# Patient Record
Sex: FEMALE | Race: WHITE | NOT HISPANIC OR LATINO | Employment: FULL TIME | URBAN - METROPOLITAN AREA
[De-identification: names, ages, dates, MRNs, and addresses within clinical notes are randomized per-mention and may not be internally consistent; named-entity substitution may affect disease eponyms.]

---

## 2017-04-19 ENCOUNTER — OFFICE VISIT (OUTPATIENT)
Dept: RETAIL CLINIC | Facility: CLINIC | Age: 42
End: 2017-04-19

## 2017-04-19 DIAGNOSIS — Z02.83 ENCOUNTER FOR DRUG SCREENING: Primary | ICD-10-CM

## 2019-02-13 ENCOUNTER — TRANSCRIBE ORDERS (OUTPATIENT)
Dept: PHYSICAL THERAPY | Facility: CLINIC | Age: 44
End: 2019-02-13

## 2019-02-13 DIAGNOSIS — M79.602 ARM PAIN, LEFT: Primary | ICD-10-CM

## 2019-02-19 ENCOUNTER — TREATMENT (OUTPATIENT)
Dept: PHYSICAL THERAPY | Facility: CLINIC | Age: 44
End: 2019-02-19

## 2019-02-19 DIAGNOSIS — M25.512 ACUTE PAIN OF LEFT SHOULDER: Primary | ICD-10-CM

## 2019-02-19 DIAGNOSIS — M79.602 ARM PAIN, LEFT: ICD-10-CM

## 2019-02-19 DIAGNOSIS — S46.212D STRAIN OF BICEPS MUSCLE, LEFT, SUBSEQUENT ENCOUNTER: ICD-10-CM

## 2019-02-19 PROCEDURE — 97530 THERAPEUTIC ACTIVITIES: CPT | Performed by: PHYSICAL THERAPIST

## 2019-02-19 PROCEDURE — 97161 PT EVAL LOW COMPLEX 20 MIN: CPT | Performed by: PHYSICAL THERAPIST

## 2019-02-19 PROCEDURE — 97110 THERAPEUTIC EXERCISES: CPT | Performed by: PHYSICAL THERAPIST

## 2019-02-19 NOTE — PROGRESS NOTES
"Physical Therapy Initial Evaluation and Plan of Care    Patient: Angie Solis   : 1975  Diagnosis/ICD-10 Code:  Acute pain of left shoulder [M25.512]  Referring practitioner: COLIN Sloan    Subjective Evaluation    History of Present Illness  Date of onset: 2019  Mechanism of injury: Unloading truck at work which was poorly loaded; unloading outdoor metal chairs (stack about 6 ft tall), stack shifted and weight of the stack caused patient to fall with it.  Immediate (L) upper arm pain. By the time she got home she was unable to lift arm without pain.      Went to [facility] ER.  Xray (L) UE - negative, given Tylenol, told to f/u with MD.  F/u with ANGELA [facility].  Placed on work restrictions, recommended PT, given Tylenol, and prescription for Ibuprofen.       Denies prior pain or injury (L) shoulder/arm.      Patient Occupation: Freight Flow Supervisor - [email or web address] Quality of life: good    Pain  Current pain ratin  At best pain ratin  Pain scale at highest: 8-9/10.  Location: (L) upper arm  Quality: dull ache and sharp  Relieving factors: ice, relaxation, rest and heat  Aggravating factors: overhead activity, outstretched reach, repetitive movement, lifting, movement and sleeping (blowdrying or brushing hair, pulling > pushing, using pallet luther, laying on (L) shoulder to sleep)  Progression: improved (\"a bit improved\")    Social Support  Lives with: spouse    Hand dominance: right    Diagnostic Tests  X-ray: normal    Treatments  Current treatment: physical therapy  Patient Goals  Patient goals for therapy: decreased pain and increased motion  Patient goal: get rid of pain, improve range of motion           Objective       Observations     Additional Observation Details  Mild to moderate swelling apparent (L) biceps muscle per visual inspection    Tenderness     Additional Tenderness Details  Mod (+) TTP over (L) LHB tendon/bicipital groove, long head biceps muscle belly, bicipital " "aponeurosis    Active Range of Motion   Left Shoulder   Flexion: 149 (\"pulling\") degrees   Extension: 45 (pulling) degrees   Abduction: 134 (pulling and pain) degrees with pain  External rotation 90°: 71 (pulling and pain) degrees with pain  Internal rotation 90°: 42 (pulling and pain) degrees with pain    Right Shoulder   Flexion: 164 degrees   Extension: 51 degrees   Abduction: 170 degrees   External rotation 90°: 85 degrees  Internal rotation 90°: 56 degrees     Strength/Myotome Testing     Left Shoulder     Planes of Motion   Flexion: 4 (with pain)   Extension: 4+   Abduction: 4- (with pain)   External rotation at 0°: 4+   Internal rotation at 0°: 4 (with pain)     Right Shoulder     Planes of Motion   Flexion: 5   Extension: 5   Abduction: 4+   External rotation at 0°: 4+   Internal rotation at 0°: 5     Left Elbow   Flexion: 4- (with pain)  Extension: 4    Right Elbow   Flexion: 5  Extension strength: 5-/5.    Tests     Left Shoulder   Positive Speed's.          Assessment & Plan     Assessment  Impairments: abnormal coordination, abnormal muscle firing, abnormal or restricted ROM, activity intolerance, impaired physical strength, lacks appropriate home exercise program and pain with function  Other impairment: inability to perform regular work tasks  Assessment details: Patient is a 43 y.o female who injured her (L) upper arm at work on 02/08/19 when a stack of metal outdoor chairs she was unloading from a truck tipped and caused her to fall.  She reports immediate (L) upper arm pain with worsening symptoms and inability to lift arm without pain.  Xrays were negative at ER.  She reports (L) upper arm symptoms 2-9/10 and demonstrates tenderness, decreased and painful (L) UE AROM, decreased (L) UE strength, and exhibits poor tolerance to ADLs and inability to perform regular work tasks.  She will benefit from skilled PT services to address these deficits and assist patient in return to painfree ADL and work " performance.  Prognosis: good  Functional Limitations: carrying objects, lifting, sleeping, pulling, pushing, uncomfortable because of pain, reaching behind back, reaching overhead and unable to perform repetitive tasks  Goals  Plan Goals: STGs: to be met in 2 weeks  1. Patient will be independent with initial HEP  2. Patient will report improved (L) UE symptoms 0-4/10 for improved comfort with ADLs  3. Patient will demonstrate full, painfree (L) shoulder and elbow AROM all planes for improved joint mobility  4. Patient will exhibit min TTP over (L) biceps muscle belly for evidence of decreasing inflammation  5. Patient will have neg Speeds test (L) UE for decreased concern for possible internal derangement    LTGs: to be met in 4 weeks  1. Patient will be independent with progressed strength and stabilization HEP  2. Patient will report full resolution of (L) UE symptoms for improved ADLs  3. Patient will demonstrate improved (L) shoulder MMT 5/5 all planes, painfree for improved function  4. 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 Patient will demonstrate ability to lift, push, pull and carry as necessary to demonstrate readiness to RTW without restrictions      Plan  Therapy options: will be seen for skilled physical therapy services  Planned modality interventions: cryotherapy, electrical stimulation/Russian stimulation and thermotherapy (hydrocollator packs)  Planned therapy interventions: compression, ADL retraining, fine motor coordination training, flexibility, functional ROM exercises, home exercise program, joint mobilization, manual therapy, motor coordination training, neuromuscular re-education, soft tissue mobilization, strengthening, stretching and therapeutic activities  Duration in visits: 10  Treatment plan discussed with: patient        Manual Therapy:         mins   46992;  Therapeutic Exercise:    16     mins  53408;     Neuromuscular Adithya:        mins  53291;    Therapeutic Activity:     12     mins  61241;     Gait Training:           mins  93105;     Ultrasound:          mins  99130;    Electrical Stimulation:         mins  52846 ( );  Dry Needling          mins self-pay    Timed Treatment:   28   mins   Total Treatment:     56   mins    PT SIGNATURE: Kelley Lofton PT, DPT   DATE TREATMENT INITIATED: 2/20/2019    Initial Certification  Certification Period: 5/21/2019  I certify that the therapy services are furnished while this patient is under my care.  The services outlined above are required by this patient, and will be reviewed every 90 days.     PHYSICIAN: Geetha Valencia, APRN      DATE:     Please sign and return via fax to [phone].. Thank you, [facility].

## 2019-02-20 ENCOUNTER — TREATMENT (OUTPATIENT)
Dept: PHYSICAL THERAPY | Facility: CLINIC | Age: 44
End: 2019-02-20

## 2019-02-20 DIAGNOSIS — M25.512 ACUTE PAIN OF LEFT SHOULDER: Primary | ICD-10-CM

## 2019-02-20 DIAGNOSIS — S46.212D STRAIN OF BICEPS MUSCLE, LEFT, SUBSEQUENT ENCOUNTER: ICD-10-CM

## 2019-02-20 DIAGNOSIS — M79.602 ARM PAIN, LEFT: ICD-10-CM

## 2019-02-20 PROCEDURE — 97014 ELECTRIC STIMULATION THERAPY: CPT | Performed by: PHYSICAL THERAPIST

## 2019-02-20 PROCEDURE — 97110 THERAPEUTIC EXERCISES: CPT | Performed by: PHYSICAL THERAPIST

## 2019-02-20 NOTE — PROGRESS NOTES
"Physical Therapy Daily Progress Note    Angie Solis reports: \"I slipped on a fabric softener spill earlier at work today and had to catch myself with a rail using my left arm so I didn't fall and it nuria my arm and elbow.  My elbow and forearm are pretty sore right now.\"    Subjective Evaluation    Pain  Current pain ratin  Location: (L) biceps muscle and medial elbow/proximal forearm           Objective   See Exercise, Manual, and Modality Logs for complete treatment.   *Increased stretch hold times  *Initiated biceps stretch and scap retracts    Assessment & Plan     Assessment  Assessment details: Patient presents with increased (L) UE symptoms, mostly localized around the medial elbow and proximal medial forearm, which she attributes to an episode of having to catch herself at work with (L) UE to prevent a fall.  However she is able to complete all activities this date without symptom exacerbation.  She is cued for painfree performance, most notably with biceps stretching, to avoid increased reactionary muscle tension.  Updated HEP printout is issued this date to promote recall and compliance, and patient responds positively to modality application including IFC electrical stimulation.        Other - reassess for MD.         Manual Therapy:         mins  92168;  Therapeutic Exercise:    27     mins  51203;     Neuromuscular Adithya:        mins  78698;    Therapeutic Activity:          mins  83801;     Gait Training:           mins  44876;     Ultrasound:          mins  11770;    Electrical Stimulation:    15     mins  95980 ( );  Dry Needling          mins self-pay    Timed Treatment:   27   mins   Total Treatment:     45   mins    Kelley Lofton PT, DPT  Physical Therapist  [place] License # [id number]    "

## 2019-02-26 ENCOUNTER — TREATMENT (OUTPATIENT)
Dept: PHYSICAL THERAPY | Facility: CLINIC | Age: 44
End: 2019-02-26

## 2019-02-26 DIAGNOSIS — M79.602 ARM PAIN, LEFT: ICD-10-CM

## 2019-02-26 DIAGNOSIS — M25.512 ACUTE PAIN OF LEFT SHOULDER: Primary | ICD-10-CM

## 2019-02-26 DIAGNOSIS — S46.212D STRAIN OF BICEPS MUSCLE, LEFT, SUBSEQUENT ENCOUNTER: ICD-10-CM

## 2019-02-26 PROCEDURE — 97140 MANUAL THERAPY 1/> REGIONS: CPT | Performed by: PHYSICAL THERAPIST

## 2019-02-26 PROCEDURE — 97014 ELECTRIC STIMULATION THERAPY: CPT | Performed by: PHYSICAL THERAPIST

## 2019-02-26 PROCEDURE — 97110 THERAPEUTIC EXERCISES: CPT | Performed by: PHYSICAL THERAPIST

## 2019-02-26 NOTE — PROGRESS NOTES
"Physical Therapy Daily Progress Note      Visit # 3      Subjective Evaluation    History of Present Illness    Subjective comment: Patient reports that she is feeling close to 80% however still notices pain with certain movements, specifically overhead or out to the side.        Objective       Observations     Additional Observation Details  Mild to moderate swelling apparent (L) biceps muscle per visual inspection    Tenderness     Additional Tenderness Details  Mod (+) TTP over (L) LHB tendon/bicipital groove, long head biceps muscle belly, bicipital aponeurosis    Active Range of Motion   Left Shoulder   Flexion: 155 (\"pulling\") degrees   Extension: 45 (pulling) degrees   Abduction: 142 (pulling and pain) degrees with pain  External rotation 90°: 71 (pulling and pain) degrees with pain  Internal rotation 90°: 42 (pulling and pain) degrees with pain    Right Shoulder   Flexion: 164 degrees   Extension: 51 degrees   Abduction: 170 degrees   External rotation 90°: 85 degrees  Internal rotation 90°: 56 degrees     Strength/Myotome Testing     Left Shoulder     Planes of Motion   Flexion: 4 (with pain)   Extension: 4+   Abduction: 4- (with pain)   External rotation at 0°: 4+   Internal rotation at 0°: 4 (with pain)     Right Shoulder     Planes of Motion   Flexion: 5   Extension: 5   Abduction: 4+   External rotation at 0°: 4+   Internal rotation at 0°: 5     Left Elbow   Flexion: 4- (with pain)  Extension: 4    Right Elbow   Flexion: 5  Extension strength: 5-/5.    Tests     Left Shoulder   Negative Hawkin's and Speed's.      See Exercise, Manual, and Modality Logs for complete treatment.       Assessment & Plan     Assessment  Assessment details: Patient tolerated treatment fairly well.  Had some mild to moderate pain with wall slides and end range ER.      Plan  Plan details: Continue to with ROM and postural strengthening.                      Manual Therapy:    10     mins  56592;  Therapeutic Exercise:    25   "   mins  08775;       Timed Treatment:   35   mins   Total Treatment:     50   mins    Kori Rosas, PT  Physical Therapist

## 2019-02-28 ENCOUNTER — TREATMENT (OUTPATIENT)
Dept: PHYSICAL THERAPY | Facility: CLINIC | Age: 44
End: 2019-02-28

## 2019-02-28 DIAGNOSIS — M79.602 ARM PAIN, LEFT: ICD-10-CM

## 2019-02-28 DIAGNOSIS — M25.512 ACUTE PAIN OF LEFT SHOULDER: Primary | ICD-10-CM

## 2019-02-28 DIAGNOSIS — S46.212D STRAIN OF BICEPS MUSCLE, LEFT, SUBSEQUENT ENCOUNTER: ICD-10-CM

## 2019-02-28 PROCEDURE — 97110 THERAPEUTIC EXERCISES: CPT | Performed by: PHYSICAL THERAPIST

## 2019-02-28 PROCEDURE — 97140 MANUAL THERAPY 1/> REGIONS: CPT | Performed by: PHYSICAL THERAPIST

## 2019-02-28 PROCEDURE — 97014 ELECTRIC STIMULATION THERAPY: CPT | Performed by: PHYSICAL THERAPIST

## 2019-02-28 NOTE — PROGRESS NOTES
"Physical Therapy Daily Progress Note    Angie Solis reports: \"My arm is still a lot better but it is sore today because a box started to fall off a shelf and I instinctively reached out to catch it. Overall my arm is moving better and pain is a little less, but I still have pain and difficulty reaching behind me or anything overhead like brushing or drying my hair with the left arm.\" Patient denies any sharp pain or differing symptoms; just a general increase in soreness of the upper arm region.    Subjective     Objective   See Exercise, Manual, and Modality Logs for complete treatment.       Assessment & Plan     Assessment  Assessment details: Patient experienced an apparent moderate exacerbation of (L) arm symptoms after instinctively reaching to catch a falling box of moderate weight.  She reports increased soreness following this incident as well as some shoulder discomfort.  PROM shoulder flexion is more uncomfortable than remaining planes.  She is encouraged in persistent HEP compliance and ice application.        Progress strengthening /stabilization /functional activity. Reassess for MD?         Manual Therapy:    11     mins  51070;  Therapeutic Exercise:    18     mins  24129;     Neuromuscular Adithya:        mins  62654;    Therapeutic Activity:          mins  63881;     Gait Training:           mins  38574;     Ultrasound:          mins  93107;    Electrical Stimulation:    15     mins  15821 ( );  Dry Needling          mins self-pay    Timed Treatment:   29   mins   Total Treatment:     47   mins    Kelley Lofton PT, DPT  Physical Therapist  [place] License # [id number]    "

## 2019-03-04 ENCOUNTER — TREATMENT (OUTPATIENT)
Dept: PHYSICAL THERAPY | Facility: CLINIC | Age: 44
End: 2019-03-04

## 2019-03-04 DIAGNOSIS — S46.212D STRAIN OF BICEPS MUSCLE, LEFT, SUBSEQUENT ENCOUNTER: ICD-10-CM

## 2019-03-04 DIAGNOSIS — M25.512 ACUTE PAIN OF LEFT SHOULDER: Primary | ICD-10-CM

## 2019-03-04 DIAGNOSIS — M79.602 ARM PAIN, LEFT: ICD-10-CM

## 2019-03-04 PROCEDURE — 97110 THERAPEUTIC EXERCISES: CPT | Performed by: PHYSICAL THERAPIST

## 2019-03-04 PROCEDURE — 97140 MANUAL THERAPY 1/> REGIONS: CPT | Performed by: PHYSICAL THERAPIST

## 2019-03-04 PROCEDURE — 97014 ELECTRIC STIMULATION THERAPY: CPT | Performed by: PHYSICAL THERAPIST

## 2019-03-04 NOTE — PROGRESS NOTES
"Physical Therapy Daily Progress Note    Angie Solis reports: \"My arm has calmed down after the incident the other day.  It's back now to where overhead, reaching across my body, and reaching behind my back are still what cause pain and give me trouble.\"    Subjective     Objective   See Exercise, Manual, and Modality Logs for complete treatment.   *Initiated (L) biceps soft tissue with active release    Assessment & Plan     Assessment  Assessment details: Patient exhibits moderate tenderness of the (L) proximal > distal biceps muscle belly.  She also consistently reports pain with combined horizontal adduction and elevation of (L) UE. Symptoms have somewhat subsided since exacerbation following patient reaching to catch a falling box last week at work. Passively this date she demonstrates full, painfree (L) shoulder flexion, abduction, and external rotation, though overhead flexion is painful when performed actively or active assisted.        Progress strengthening /stabilization /functional activity         Manual Therapy:    13     mins  01489;  Therapeutic Exercise:    21     mins  39481;     Neuromuscular Adithya:        mins  01519;    Therapeutic Activity:          mins  53666;     Gait Training:           mins  78084;     Ultrasound:          mins  67324;    Electrical Stimulation:  15       mins  70239 ( );  Dry Needling          mins self-pay    Timed Treatment:   34   mins   Total Treatment:     49   mins    Kelley Lofton PT, DPT  Physical Therapist  [place] License # [id number]    "

## 2019-03-06 ENCOUNTER — TREATMENT (OUTPATIENT)
Dept: PHYSICAL THERAPY | Facility: CLINIC | Age: 44
End: 2019-03-06

## 2019-03-06 DIAGNOSIS — M25.512 ACUTE PAIN OF LEFT SHOULDER: Primary | ICD-10-CM

## 2019-03-06 DIAGNOSIS — S46.212D STRAIN OF BICEPS MUSCLE, LEFT, SUBSEQUENT ENCOUNTER: ICD-10-CM

## 2019-03-06 DIAGNOSIS — M79.602 ARM PAIN, LEFT: ICD-10-CM

## 2019-03-06 PROCEDURE — 97110 THERAPEUTIC EXERCISES: CPT | Performed by: PHYSICAL THERAPIST

## 2019-03-06 PROCEDURE — 97014 ELECTRIC STIMULATION THERAPY: CPT | Performed by: PHYSICAL THERAPIST

## 2019-03-06 PROCEDURE — 97140 MANUAL THERAPY 1/> REGIONS: CPT | Performed by: PHYSICAL THERAPIST

## 2019-03-11 ENCOUNTER — TREATMENT (OUTPATIENT)
Dept: PHYSICAL THERAPY | Facility: CLINIC | Age: 44
End: 2019-03-11

## 2019-03-11 DIAGNOSIS — S46.212D STRAIN OF BICEPS MUSCLE, LEFT, SUBSEQUENT ENCOUNTER: ICD-10-CM

## 2019-03-11 DIAGNOSIS — M25.512 ACUTE PAIN OF LEFT SHOULDER: Primary | ICD-10-CM

## 2019-03-11 DIAGNOSIS — M79.602 ARM PAIN, LEFT: ICD-10-CM

## 2019-03-11 PROCEDURE — 97014 ELECTRIC STIMULATION THERAPY: CPT | Performed by: PHYSICAL THERAPIST

## 2019-03-11 PROCEDURE — 97530 THERAPEUTIC ACTIVITIES: CPT | Performed by: PHYSICAL THERAPIST

## 2019-03-11 PROCEDURE — 97110 THERAPEUTIC EXERCISES: CPT | Performed by: PHYSICAL THERAPIST

## 2019-03-11 NOTE — PROGRESS NOTES
"Physical Therapy Progress Note    3/11/2019  Geetha Valencia, APRN    Re: Angie Solis  ________________________________________________________________    Mrs. Angie Solis, has attended 7/7 PT sessions.  Treatment has consisted of: patient education, therapeutic activity, therapeutic exercise, manual therapy, and modalities.     S: Mrs. Angie Solsi states: \"My arm is feeling better than it was last week.  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  I still have pain when I reach across with that arm and raise it up at the same time.\" Patient denies noticeable popping or catching (L) shoulder.    Subjective Evaluation    Pain  Pain scale: \"discomfort\"  At worst pain ratin (sharp but brief)  Location: (L) anterior shoulder           Objective       Tenderness     Left Shoulder   Tenderness in the biceps tendon (proximal).     Additional Tenderness Details  Min/mod (+) TTP (L) anterior GH joint line, proximal LHB tendon, proximal biceps muscle belly    Active Range of Motion   Left Shoulder   Flexion: 164 degrees   Extension: 52 degrees   Abduction: 131 (eccentric pain) degrees with pain  External rotation 90°: 79 degrees with pain  Internal rotation 90°: 61 degrees     Strength/Myotome Testing     Left Shoulder     Planes of Motion   Left shoulder forward flexion strength: 5-/5.   Extension: 5   Abduction: 4+ (with pain)   Left shoulder external rotation strength at 0 degrees: 5-/5.   Internal rotation at 0°: 4+ (with pain)     Left Elbow   Flexion: 5  Extension strength: 5-/5, with discomfort     Tests     Left Shoulder   Positive active compression (Eitzen) and passive horizontal adduction.      See Exercise, Manual, and Modality Logs for complete treatment.       Assessment & Plan     Assessment  Assessment details: Patient has been compliant and cooperative with PT efforts.  Overall bicipital " symptoms have demonstrated good improvement since initial evaluation but of concern now is localization of pain to (L) anterior shoulder and proximal LHB tendon. She reports pain with combined horizontal adduction and elevation, horizontal abduction, eccentric pain with abduction, and pain at end range combined abduction and external rotation.  She is tender to palpation at (L) anterior joint line, LHB tendon, and proximal biceps muscle belly.  Zepeda's test and passive horizontal adduction test are positive.  It may be beneficial to proceed with soft tissue imaging for this patient if you are in agreement.        Other - patient to f/u with MD tomorrow (03/12/19) at 7pm.         Manual Therapy:         mins  89922;  Therapeutic Exercise:    33     mins  17046;     Neuromuscular Adithya:        mins  43048;    Therapeutic Activity:     11     mins  54432;     Gait Training:           mins  66395;     Ultrasound:          mins  69944;    Electrical Stimulation:    15     mins  61546 ( );  Dry Needling          mins self-pay    Timed Treatment:   44   mins   Total Treatment:     56   mins    Kelley Lofton PT, DPT  Physical Therapist  [place] License # [id number]

## 2019-03-20 ENCOUNTER — TREATMENT (OUTPATIENT)
Dept: PHYSICAL THERAPY | Facility: CLINIC | Age: 44
End: 2019-03-20

## 2019-03-20 DIAGNOSIS — S46.212D STRAIN OF BICEPS MUSCLE, LEFT, SUBSEQUENT ENCOUNTER: ICD-10-CM

## 2019-03-20 DIAGNOSIS — M25.512 ACUTE PAIN OF LEFT SHOULDER: Primary | ICD-10-CM

## 2019-03-20 DIAGNOSIS — M79.602 ARM PAIN, LEFT: ICD-10-CM

## 2019-03-20 PROCEDURE — 97014 ELECTRIC STIMULATION THERAPY: CPT | Performed by: PHYSICAL THERAPIST

## 2019-03-20 PROCEDURE — 97110 THERAPEUTIC EXERCISES: CPT | Performed by: PHYSICAL THERAPIST

## 2019-03-20 NOTE — PROGRESS NOTES
"Physical Therapy Daily Progress Note    Angie Solis reports: \"My MRI is scheduled for 03/25/19.  I'm still having those same issues with raising my arm overhead, across, and behind my back.  I do think it is getting stronger.\"     Subjective     Objective   See Exercise, Manual, and Modality Logs for complete treatment.   *Increased hold time of spinal rotations  *Initiated hitchhikers and resisted rows    Assessment & Plan     Assessment  Assessment details: Patient presents without significant change in (L) shoulder symptoms since last PT session, though she verbalizes feeling of improved strength.  She is able to initiate basic resisted scapular strengthening activities this date without significant symptom provocation but quick onset of muscular fatigue.        Progress strengthening /stabilization /functional activity         Manual Therapy:         mins  84524;  Therapeutic Exercise:    29     mins  86901;     Neuromuscular Adithya:        mins  62726;    Therapeutic Activity:          mins  48349;     Gait Training:           mins  68909;     Ultrasound:          mins  02705;    Electrical Stimulation:    15     mins  02298 ( );  Dry Needling          mins self-pay    Timed Treatment:   29   mins   Total Treatment:     49   mins    Kelley Lofton PT, DPT  Physical Therapist  [place] License # [id number]    "

## 2019-03-21 ENCOUNTER — TREATMENT (OUTPATIENT)
Dept: PHYSICAL THERAPY | Facility: CLINIC | Age: 44
End: 2019-03-21

## 2019-03-21 DIAGNOSIS — M79.602 ARM PAIN, LEFT: ICD-10-CM

## 2019-03-21 DIAGNOSIS — S46.212D STRAIN OF BICEPS MUSCLE, LEFT, SUBSEQUENT ENCOUNTER: ICD-10-CM

## 2019-03-21 DIAGNOSIS — M25.512 ACUTE PAIN OF LEFT SHOULDER: Primary | ICD-10-CM

## 2019-03-21 PROCEDURE — 97110 THERAPEUTIC EXERCISES: CPT | Performed by: PHYSICAL THERAPIST

## 2019-03-21 PROCEDURE — 97014 ELECTRIC STIMULATION THERAPY: CPT | Performed by: PHYSICAL THERAPIST

## 2019-03-21 NOTE — PROGRESS NOTES
Physical Therapy Daily Progress Note    Angie Solis reports: Shoulder was a little sore after last visit but manageable.  I didn't use my arm as much at work today so it's a little tighter than normal.    Subjective Evaluation    Pain  Current pain rating: 3  Location: (L) anterior shoulder           Objective   See Exercise, Manual, and Modality Logs for complete treatment.       Assessment & Plan     Assessment  Assessment details: Patient reports increased tightness and soreness with overhead stretching activities this date (wall slides) but is able to complete activities without significant symptom provocation.  She exhibits signs of overall increased fatigue this date.  Encouraged persistent HEP compliance while awaiting MRI and results in order to maximize ROM and maintain functional strength as much as possible.  Patient is agreeable to this.        Other - await results of MRI scheduled for Monday, 03/25/19.         Manual Therapy:         mins  72593;  Therapeutic Exercise:    34    mins  50699;     Neuromuscular Adithya:        mins  83033;    Therapeutic Activity:          mins  06991;     Gait Training:           mins  97470;     Ultrasound:          mins  98384;    Electrical Stimulation:    15     mins  34520 ( );  Dry Needling          mins self-pay    Timed Treatment:   34   mins   Total Treatment:     53   mins    Kelley Lofton PT, DPT  Physical Therapist  [place] License # [id number]

## 2020-01-29 ENCOUNTER — DOCUMENTATION (OUTPATIENT)
Dept: PHYSICAL THERAPY | Facility: CLINIC | Age: 45
End: 2020-01-29

## 2022-01-10 ENCOUNTER — PRE-PROCEDURE SCREENING (OUTPATIENT)
Dept: GASTROENTEROLOGY | Facility: CLINIC | Age: 47
End: 2022-01-10